# Patient Record
Sex: FEMALE | Race: WHITE | NOT HISPANIC OR LATINO | Employment: FULL TIME | URBAN - METROPOLITAN AREA
[De-identification: names, ages, dates, MRNs, and addresses within clinical notes are randomized per-mention and may not be internally consistent; named-entity substitution may affect disease eponyms.]

---

## 2020-12-15 ENCOUNTER — EVALUATION (OUTPATIENT)
Dept: OCCUPATIONAL THERAPY | Facility: CLINIC | Age: 55
End: 2020-12-15
Payer: COMMERCIAL

## 2020-12-15 DIAGNOSIS — M65.4 TENDINITIS, DE QUERVAIN'S: Primary | ICD-10-CM

## 2020-12-15 PROCEDURE — 97165 OT EVAL LOW COMPLEX 30 MIN: CPT

## 2020-12-22 ENCOUNTER — TRANSCRIBE ORDERS (OUTPATIENT)
Dept: PHYSICAL THERAPY | Facility: CLINIC | Age: 55
End: 2020-12-22

## 2020-12-22 ENCOUNTER — OFFICE VISIT (OUTPATIENT)
Dept: OCCUPATIONAL THERAPY | Facility: CLINIC | Age: 55
End: 2020-12-22
Payer: COMMERCIAL

## 2020-12-22 DIAGNOSIS — M65.4 TENDINITIS, DE QUERVAIN'S: Primary | ICD-10-CM

## 2020-12-22 PROCEDURE — 97140 MANUAL THERAPY 1/> REGIONS: CPT

## 2020-12-22 PROCEDURE — 97110 THERAPEUTIC EXERCISES: CPT

## 2020-12-29 ENCOUNTER — OFFICE VISIT (OUTPATIENT)
Dept: PHYSICAL THERAPY | Facility: CLINIC | Age: 55
End: 2020-12-29
Payer: COMMERCIAL

## 2020-12-29 DIAGNOSIS — M65.4 DE QUERVAIN'S TENOSYNOVITIS: Primary | ICD-10-CM

## 2021-01-05 ENCOUNTER — OFFICE VISIT (OUTPATIENT)
Dept: OCCUPATIONAL THERAPY | Facility: CLINIC | Age: 56
End: 2021-01-05
Payer: COMMERCIAL

## 2021-01-05 DIAGNOSIS — M65.4 TENDINITIS, DE QUERVAIN'S: Primary | ICD-10-CM

## 2021-01-05 PROCEDURE — 97110 THERAPEUTIC EXERCISES: CPT

## 2021-01-05 PROCEDURE — 97140 MANUAL THERAPY 1/> REGIONS: CPT

## 2021-01-05 PROCEDURE — 97530 THERAPEUTIC ACTIVITIES: CPT

## 2021-01-05 NOTE — PROGRESS NOTES
Daily Note     Today's date: 2021  Patient name: Susana Bell  : 1965  MRN: [id number]  Referring provider: Yovana Maddox MD  Dx:   Encounter Diagnosis     ICD-10-CM    1  Tendinitis, de Quervain's  M65 4                   Subjective: Patient notes she still has pain mostly in the tip of the thumb with excessive bending  Objective: See treatment diary below              Precautions: Dequervains release and IP joint cyst removal 2020       Manuals  2020     STR  x3 min  x3 min      Grade 2-3 mobs thumb  x10 x10                     Ther Ex        AROM wrist all planes x10 x10 x20     AROM thumb all planes x10 x10 x20     BROM thumb IP/MP x10 x10 x20     Education on HEP and dx yes yes Yes      Ther Activity        Wrist maze  x5 x5     Prayer stretch  x5 10 sec x5 10sec     Towel scrunch   x10 x10     Sponge /pinch   x10 pink     Clothes pin pinch   Red x10     Digit ext band   x10 yellow             Modalities        MHP 5 min  5min  5 min                 Assessment:   Patient tolerated session well  Patient session focused on using HEP, modalities, and AROM to improve ability to complete ADLs and self care with ease  Patient tolerated added TE this date with noted improved AROM  Patient benefiting from skilled OT to reduce dependence with ADLs  Plan:   Focus on ROM and strengthening to improve ability to complete ADLs with ease

## 2021-01-13 ENCOUNTER — OFFICE VISIT (OUTPATIENT)
Dept: OCCUPATIONAL THERAPY | Facility: CLINIC | Age: 56
End: 2021-01-13
Payer: COMMERCIAL

## 2021-01-13 DIAGNOSIS — M65.4 TENDINITIS, DE QUERVAIN'S: Primary | ICD-10-CM

## 2021-01-13 PROCEDURE — 97530 THERAPEUTIC ACTIVITIES: CPT

## 2021-01-13 PROCEDURE — 97140 MANUAL THERAPY 1/> REGIONS: CPT

## 2021-01-13 PROCEDURE — 97110 THERAPEUTIC EXERCISES: CPT

## 2021-01-13 NOTE — PROGRESS NOTES
Daily Note     Today's date: 2021  Patient name: Clifton Caban  : 1965  MRN: [id number]  Referring provider: Kosta Caballero MD  Dx:   Encounter Diagnosis     ICD-10-CM    1  Tendinitis, de Quervain's  M65 4                   Subjective: Patient notes she still has pain mostly in the tip of the thumb with excessive bending  Objective: Thumb flex MP/IP 50/62   See treatment diary below              Precautions: Dequervains release and IP joint cyst removal 2020       Manuals  2020    STR  x3 min  x3 min  x3 min    Grade 2-3 mobs thumb  x10 x10 7 min                     Ther Ex        AROM wrist all planes x10 x10 x20 x20    AROM thumb all planes x10 x10 x20 x20    BROM thumb IP/MP x10 x10 x20 x20    Education on HEP and dx yes yes Yes  Yes     PREs wrist flex/ext    2 lbs x20     Ther Activity        Wrist maze  x5 x5     Prayer stretch  x5 10 sec x5 10sec     Towel scrunch   x10 x10     Gripper    x10 pink x20 level 2 gold    Clothes pin pinch   Red x10 Red x20    Digit ext band   x10 yellow     Flex bar all planes    Red flex bar x20    Modalities        MHP 5 min  5min  5 min  5 min                Assessment:   Patient tolerated session well  Patient session focused on using HEP, modalities, and AROM to improve ability to complete ADLs and self care with ease  Patient tolerated added TE this date with noted improved AROM this date  Patient benefiting from skilled OT to reduce dependence with ADLs  Plan:   Focus on ROM and strengthening to improve ability to complete ADLs with ease

## 2021-01-14 ENCOUNTER — TRANSCRIBE ORDERS (OUTPATIENT)
Dept: OCCUPATIONAL THERAPY | Facility: CLINIC | Age: 56
End: 2021-01-14

## 2021-01-14 DIAGNOSIS — M65.4 TENDINITIS, DE QUERVAIN'S: Primary | ICD-10-CM

## 2021-01-19 ENCOUNTER — OFFICE VISIT (OUTPATIENT)
Dept: OCCUPATIONAL THERAPY | Facility: CLINIC | Age: 56
End: 2021-01-19
Payer: COMMERCIAL

## 2021-01-19 DIAGNOSIS — M65.4 TENDINITIS, DE QUERVAIN'S: Primary | ICD-10-CM

## 2021-01-19 PROCEDURE — 97530 THERAPEUTIC ACTIVITIES: CPT

## 2021-01-19 PROCEDURE — 97110 THERAPEUTIC EXERCISES: CPT

## 2021-01-19 NOTE — PROGRESS NOTES
Daily Note     Today's date: 2021  Patient name: Kenia Milan  : 1965  MRN: [id number]  Referring provider: Rekha Tilley MD  Dx:   Encounter Diagnosis     ICD-10-CM    1  Tendinitis, de Quervain's  M65 4        Start Time: 9576  Stop Time: 407  Total time in clinic (min): 37 minutes    Subjective: "When I use it it seems to tingle and burn " Patient reported that lifting with radial deviation and utilizing a screwdriver is painful  Patient reported a pain of 5/10 in her wrist post exercise  Objective:   See treatment diary below       Precautions: Dequervains release and IP joint cyst removal 2020       Manuals  2020   STR  x3 min  x3 min  x3 min x2 min   Grade 2-3 mobs thumb  x10 x10 7 min  5 min                   Ther Ex        AROM wrist all planes x10 x10 x20 x20 x15   AROM thumb all planes x10 x10 x20 x20 x15   BROM thumb IP/MP x10 x10 x20 x20 x15   Education on HEP and dx yes yes Yes  Yes     PREs wrist flex/ext/RD    2 lbs x20  2 lbs x10   Ther Activity        Wrist maze  x5 x5     Prayer stretch  x5 10 sec x5 10sec  x5 10 sec   Towel scrunch   x10 x10     Gripper    x10 pink x20 level 2 gold x20 level 2 gold   Clothes pin pinch   Red x10 Red x20 Red x20   Digit ext band   x10 yellow  x20 red   Flex bar all planes    Red flex bar x20 Red x20   Theraputty , roll, pinch     performed           Modalities        MHP 5 min  5min  5 min  5 min  5 min              Assessment:   Patient tolerated session well  Patient session focused on using modalities, range of motion, and gentle strengthening to improve ability to complete self care with minimal symptoms  Patient tolerated addition of wrist PREs this date without complaints of pain  Patient benefiting from skilled OT to reduce dependence with ADLs  Plan:   Focus on ROM and strengthening to improve ability to complete ADLs with ease

## 2021-01-26 ENCOUNTER — OFFICE VISIT (OUTPATIENT)
Dept: OCCUPATIONAL THERAPY | Facility: CLINIC | Age: 56
End: 2021-01-26
Payer: COMMERCIAL

## 2021-01-26 DIAGNOSIS — M65.4 TENDINITIS, DE QUERVAIN'S: Primary | ICD-10-CM

## 2021-01-26 PROCEDURE — 97140 MANUAL THERAPY 1/> REGIONS: CPT

## 2021-01-26 PROCEDURE — 97530 THERAPEUTIC ACTIVITIES: CPT

## 2021-01-26 PROCEDURE — 97110 THERAPEUTIC EXERCISES: CPT

## 2021-01-26 NOTE — PROGRESS NOTES
Daily Note     Today's date: 2021  Patient name: Jake Dodge  : 1965  MRN: [id number]  Referring provider: Niraj Holt MD  Dx:   Encounter Diagnosis     ICD-10-CM    1  Tendinitis, de Quervain's  M65 4                   Subjective: Patient offers no functional changes  Objective:   See treatment diary below       Precautions: Dequervains release and IP joint cyst removal 2020       Manuals  2020   STR  x3 min  x3 min  x3 min x3 min   Grade 2-3 mobs thumb  x10 x10 7 min  7 min                   Ther Ex        AROM wrist all planes x10 x10 x20 x20 x15   AROM thumb all planes x10 x10 x20 x20 x15   BROM thumb IP/MP x10 x10 x20 x20 x15   Education on HEP and dx yes yes Yes  Yes  yes   PREs wrist flex/ext/RD    2 lbs x20  3 lbs x10   Ther Activity        Wrist maze  x5 x5     Prayer stretch  x5 10 sec x5 10sec     Towel scrunch   x10 x10     Gripper    x10 pink x20 level 2 gold x20 level 2 gold   Clothes pin pinch   Red x10 Red x20 Red x20   Digit ext band   x10 yellow  x20 red   Flex bar all planes    Red flex bar x20 Red x20   Theraputty , roll, pinch                Modalities        MHP 5 min  5min  5 min  5 min  5 min              Assessment:   Patient tolerated session well  Patient session focused on using modalities, range of motion, and gentle strengthening to improve ability to complete self care with minimal symptoms  Patient benefiting from skilled OT to reduce dependence with ADLs  HEP reviewed this date  Patient WFL at this time  Patient will step down to HEP  Plan:   D/C to HEP

## 2022-03-09 ENCOUNTER — TELEPHONE (OUTPATIENT)
Dept: OBGYN CLINIC | Facility: MEDICAL CENTER | Age: 57
End: 2022-03-09

## 2022-03-09 NOTE — TELEPHONE ENCOUNTER
Patient wants to schedule appointment for Dr Stacie Esquivel  She had surgery on right wrist, tendon release on 12/01/2020  She will sign for medical record release and have records faxed to office for  review and if office would call her to let her know if dr will see her for second opinion, she is having pain again in her wrist     CB # [phone], can leave message

## 2022-04-05 ENCOUNTER — APPOINTMENT (OUTPATIENT)
Dept: RADIOLOGY | Facility: CLINIC | Age: 57
End: 2022-04-05
Payer: COMMERCIAL

## 2022-04-05 ENCOUNTER — OFFICE VISIT (OUTPATIENT)
Dept: OBGYN CLINIC | Facility: CLINIC | Age: 57
End: 2022-04-05
Payer: COMMERCIAL

## 2022-04-05 VITALS
HEART RATE: 80 BPM | WEIGHT: 152 LBS | DIASTOLIC BLOOD PRESSURE: 72 MMHG | HEIGHT: 65 IN | BODY MASS INDEX: 25.33 KG/M2 | SYSTOLIC BLOOD PRESSURE: 115 MMHG | TEMPERATURE: 97.2 F

## 2022-04-05 DIAGNOSIS — Z98.890 STATUS POST DE QUERVAIN'S RELEASE SURGERY: Primary | ICD-10-CM

## 2022-04-05 DIAGNOSIS — M25.531 PAIN IN RIGHT WRIST: ICD-10-CM

## 2022-04-05 PROCEDURE — 99203 OFFICE O/P NEW LOW 30 MIN: CPT | Performed by: ORTHOPAEDIC SURGERY

## 2022-04-05 PROCEDURE — 20550 NJX 1 TENDON SHEATH/LIGAMENT: CPT | Performed by: ORTHOPAEDIC SURGERY

## 2022-04-05 PROCEDURE — 73110 X-RAY EXAM OF WRIST: CPT

## 2022-04-05 RX ORDER — ASPIRIN 81 MG/1
81 TABLET ORAL DAILY
COMMUNITY

## 2022-04-05 RX ORDER — DEXAMETHASONE SODIUM PHOSPHATE 100 MG/10ML
40 INJECTION INTRAMUSCULAR; INTRAVENOUS
Status: COMPLETED | OUTPATIENT
Start: 2022-04-05 | End: 2022-04-05

## 2022-04-05 RX ORDER — LIDOCAINE HYDROCHLORIDE 10 MG/ML
0.5 INJECTION, SOLUTION INFILTRATION; PERINEURAL
Status: COMPLETED | OUTPATIENT
Start: 2022-04-05 | End: 2022-04-05

## 2022-04-05 RX ADMIN — LIDOCAINE HYDROCHLORIDE 0.5 ML: 10 INJECTION, SOLUTION INFILTRATION; PERINEURAL at 09:33

## 2022-04-05 RX ADMIN — DEXAMETHASONE SODIUM PHOSPHATE 40 MG: 100 INJECTION INTRAMUSCULAR; INTRAVENOUS at 09:33

## 2022-04-05 NOTE — PROGRESS NOTES
Assessment/Plan:  1  Status post de Quervain's release surgery  Ambulatory Referral to PT/OT Hand Therapy    Hand/upper extremity injection: R extensor compartment 1   2  Pain in right wrist  XR wrist 3+ vw right       Scribe Attestation    I,:  Justine Anaya MA am acting as a scribe while in the presence of the attending physician :       I,:  Eliazar Martinez DO personally performed the services described in this documentation    as scribed in my presence  :             21-year-old female status post right wrist de quervain's release performed on 12/1/2020 by Dr Paolo Malloy  She is non tender over the scar  She does have a positive tinel's over the scar  I discussed with her that she likely has scar tissue that is irritating the nerve  Patient has been wrapping coban tightly around her wrist  Patient was advised to discontinue the coban wrap  Treatment options were discussed in the form of bracing, a steroid injection, and formal therapy to work on scar massage  Patient was agreeable to this  She consented and underwent the steroid injection in the office today without any complications  She can continue with the soft brace  A referral was provided to OT for scar massage  She was advised to use Voltaren Gel OTC as needed for pain  She may continue to work full duty with no restrictions  She will follow up in 2 months for repeat evaluation  Subjective:   Hina Lechuga is a 55 y o  female who presents to the office today for evaluation of right wrist pain  Patient did undergo right wrist de quervain's release and right humb IP joint cyst removal on 12/1/20 with Dr Paolo Malloy  Patient states the surgery did provide her with appx 10 months worth of relief and her pain started to return  She notes pain to the radial aspect of her wrist which is increased with   She states the pain is not as severe as prior to surgery  She also notes numbness and tingling to the radial aspect of her wrist that radiates up the thumb  Patient does work for a [employer] and does use a lot of tools  Patient did do therapy after surgery and most recently as been doing therapy over the past month in [place], [place]  She has also tried a thumb brace, and OTC wrist brace, and coban  She has not been taking anything OTC for pain  Patient states she was evaluated by Dr Taylor Luciano who recommend she seek a second opinion  She denies any injury or trauma  Review of Systems   Constitutional: Negative for chills and fever  HENT: Negative for drooling and sneezing  Eyes: Negative for redness  Respiratory: Negative for cough and wheezing  Gastrointestinal: Negative for nausea and vomiting  Musculoskeletal: Negative for arthralgias, joint swelling and myalgias  Neurological: Negative for weakness and numbness  Psychiatric/Behavioral: Negative for behavioral problems  The patient is not nervous/anxious  No past medical history on file  No past surgical history on file  No family history on file      Social History     Occupational History    Not on file   Tobacco Use    Smoking status: Current Every Day Smoker    Smokeless tobacco: Never Used   Vaping Use    Vaping Use: Never used   Substance and Sexual Activity    Alcohol use: Not on file    Drug use: Not on file    Sexual activity: Not on file         Current Outpatient Medications:     aspirin (ECOTRIN LOW STRENGTH) 81 mg EC tablet, Take 81 mg by mouth daily, Disp: , Rfl:     Calcium Carbonate Antacid (CALCIUM CARBONATE PO), Take 1,000 mg by mouth daily, Disp: , Rfl:     Multiple Vitamin (MULTIVITAMIN ADULT PO), Take 1 tablet by mouth daily, Disp: , Rfl:     TURMERIC PO, Take by mouth daily, Disp: , Rfl:     No Known Allergies    Objective:  Vitals:    04/05/22 0902   BP: 115/72   Pulse: 80   Temp: (!) 97 2 °F (36 2 °C)       Ortho Exam     Right wrist    NTTP scar  No palpable masses  EPL FPL intact  + tinel's over scar  Compartments soft  Brisk capillary refill  S/m intact median, radial, and ulnar nerve     Physical Exam  Constitutional:       Appearance: She is well-developed  HENT:      Head: Normocephalic and atraumatic  Eyes:      General:         Right eye: No discharge  Left eye: No discharge  Conjunctiva/sclera: Conjunctivae normal    Cardiovascular:      Rate and Rhythm: Normal rate  Pulmonary:      Effort: Pulmonary effort is normal  No respiratory distress  Musculoskeletal:      Cervical back: Normal range of motion and neck supple  Comments: As noted in HPI   Skin:     General: Skin is warm and dry  Neurological:      Mental Status: She is alert and oriented to person, place, and time  Psychiatric:         Behavior: Behavior normal          Thought Content: Thought content normal          Judgment: Judgment normal      Hand/upper extremity injection: R extensor compartment 1  Universal Protocol:  Consent: Verbal consent obtained  Consent given by: patient  Patient identity confirmed: verbally with patient    Supporting Documentation  Indications: pain   Procedure Details  Condition:de Quervain's tenosynovitis Site: R extensor compartment 1   Preparation: Patient was prepped and draped in the usual sterile fashion  Needle size: 27 G  Ultrasound guidance: no  Medications administered: 0 5 mL lidocaine 1 %; 40 mg dexamethasone 100 mg/10 mL    Patient tolerance: patient tolerated the procedure well with no immediate complications  Dressing:  Sterile dressing applied             I have personally reviewed pertinent films in PACS and my interpretation is as follows:X-ray right wrist performed in the office today demonstrates no osseous abnormalities

## 2022-06-06 ENCOUNTER — OFFICE VISIT (OUTPATIENT)
Dept: OBGYN CLINIC | Facility: CLINIC | Age: 57
End: 2022-06-06
Payer: COMMERCIAL

## 2022-06-06 VITALS
DIASTOLIC BLOOD PRESSURE: 79 MMHG | HEART RATE: 59 BPM | BODY MASS INDEX: 26.33 KG/M2 | WEIGHT: 158 LBS | HEIGHT: 65 IN | SYSTOLIC BLOOD PRESSURE: 133 MMHG

## 2022-06-06 DIAGNOSIS — Z98.890 STATUS POST DE QUERVAIN'S RELEASE SURGERY: Primary | ICD-10-CM

## 2022-06-06 PROCEDURE — 99213 OFFICE O/P EST LOW 20 MIN: CPT | Performed by: ORTHOPAEDIC SURGERY

## 2022-06-06 NOTE — PROGRESS NOTES
Assessment/Plan:  1  Status post de Quervain's release surgery         Scribe Attestation    I,:  Justine Anaya MA am acting as a scribe while in the presence of the attending physician :       I,:  Igor Hernandez DO personally performed the services described in this documentation    as scribed in my presence  :             56-year-old female status post right wrist de quervain's release performed on 12/1/2020 by Dr Ceci Mehta  Patient does have a positive tinel's over the scar  I did discuss she may have scar formation  Patient has tried and failed non operative treatment in the form of bracing, a steroid injection, and formal therapy  We did discuss possible surgical intervention to release the scar  I did discuss I am leaving the practice and will be unable to follow up with the patient  She was instructed to follow up with one of my partner's Dr Gail Barbosa or Dr Marion Dunne for evaluation  She can continue with scar massage  Subjective:   Bella Vasquez is a 55 y o  female who presents to the office today for follow up evaluation of her right wrist  Patient did undergo right wrist de quervain's release and right humb IP joint cyst removal on 12/1/20 with Dr Ceci Mehta  Patient underwent a steroid injection at her last visit on 4/5/22 which she states did provide her with some relief  She notes continued tingling to the radial aspect of her wrist that radiates into her thumb  She states this is unchanged since her last visit  She did attend therapy for scar massage which she states did not provide her with relief  She has discontinued the use of the coban  she notes occ pain to the radial aspect of her wrist with overuse  She does not take anything OTC for pain  Review of Systems   Constitutional: Negative for chills and fever  HENT: Negative for drooling and sneezing  Eyes: Negative for redness  Respiratory: Negative for cough and wheezing  Gastrointestinal: Negative for nausea and vomiting  Musculoskeletal: Negative for arthralgias, joint swelling and myalgias  Neurological: Negative for weakness and numbness  Psychiatric/Behavioral: Negative for behavioral problems  The patient is not nervous/anxious  History reviewed  No pertinent past medical history  History reviewed  No pertinent surgical history  History reviewed  No pertinent family history  Social History     Occupational History    Not on file   Tobacco Use    Smoking status: Current Every Day Smoker    Smokeless tobacco: Never Used   Vaping Use    Vaping Use: Never used   Substance and Sexual Activity    Alcohol use: Not on file    Drug use: Not on file    Sexual activity: Not on file         Current Outpatient Medications:     aspirin (ECOTRIN LOW STRENGTH) 81 mg EC tablet, Take 81 mg by mouth daily, Disp: , Rfl:     Calcium Carbonate Antacid (CALCIUM CARBONATE PO), Take 1,000 mg by mouth daily, Disp: , Rfl:     Multiple Vitamin (MULTIVITAMIN ADULT PO), Take 1 tablet by mouth daily, Disp: , Rfl:     TURMERIC PO, Take by mouth daily, Disp: , Rfl:     Allergies   Allergen Reactions    Amoxicillin-Pot Clavulanate GI Intolerance     VOMITTING    Ciprofloxacin Other (See Comments)     HEADACHES       Objective:  Vitals:    06/06/22 1513   BP: 133/79   Pulse: 59       Ortho Exam     Right wrist    + tinel's over the scar  Full fist  EPL FPL intact  FDS FDP ext intact  Good sensation over radial nerve distrubution   Compartments soft  Brisk capillary refill  S/m intact median, radial, and ulnar nerve     Physical Exam  Constitutional:       Appearance: She is well-developed  HENT:      Head: Normocephalic and atraumatic  Eyes:      General:         Right eye: No discharge  Left eye: No discharge  Conjunctiva/sclera: Conjunctivae normal    Cardiovascular:      Rate and Rhythm: Normal rate  Pulmonary:      Effort: Pulmonary effort is normal  No respiratory distress     Musculoskeletal: Cervical back: Normal range of motion and neck supple  Comments: As noted in HPI   Skin:     General: Skin is warm and dry  Neurological:      Mental Status: She is alert and oriented to person, place, and time  Psychiatric:         Behavior: Behavior normal          Thought Content:  Thought content normal          Judgment: Judgment normal

## 2023-02-02 ENCOUNTER — TELEPHONE (OUTPATIENT)
Dept: DERMATOLOGY | Facility: CLINIC | Age: 58
End: 2023-02-02

## 2024-12-09 ENCOUNTER — OFFICE VISIT (OUTPATIENT)
Age: 59
End: 2024-12-09
Payer: COMMERCIAL

## 2024-12-09 VITALS — WEIGHT: 161 LBS | BODY MASS INDEX: 26.79 KG/M2 | TEMPERATURE: 97.6 F

## 2024-12-09 DIAGNOSIS — L71.9 ROSACEA: Primary | ICD-10-CM

## 2024-12-09 PROCEDURE — 99204 OFFICE O/P NEW MOD 45 MIN: CPT | Performed by: DERMATOLOGY

## 2024-12-09 RX ORDER — DOXYCYCLINE HYCLATE 20 MG
TABLET ORAL
Qty: 60 TABLET | Refills: 1 | Status: SHIPPED | OUTPATIENT
Start: 2024-12-09 | End: 2025-02-09

## 2024-12-09 NOTE — PROGRESS NOTES
"[facility] Note     Patient Name: Suzette Carr  Encounter Date: 12/9/24     Have you been cared for by a [facility] Dermatologist in the last 3 years and, if so, which description applies to you?    NO.   I am considered a \"new\" patient and must complete all patient intake questions. I am FEMALE/of child-bearing potential.    REVIEW OF SYSTEMS:  Have you recently had or currently have any of the following? Recent fever or chills? No  Any non-healing wound? No  Are you pregnant or planning to become pregnant? No  Are you currently or planning to be nursing or breast feeding? No   PAST MEDICAL HISTORY:  Have you personally ever had or currently have any of the following?  If \"YES,\" then please provide more detail. Skin cancer (such as Melanoma, Basal Cell Carcinoma, Squamous Cell Carcinoma?  No  Tuberculosis, HIV/AIDS, Hepatitis B or C: No  Radiation Treatment No   HISTORY OF IMMUNOSUPPRESSION:   Do you have a history of any of the following:  Systemic Immunosuppression such as Diabetes, Biologic or Immunotherapy, Chemotherapy, Organ Transplantation, Bone Marrow Transplantation or Prednsione?  No    Answering \"YES\" requires the addition of the dotphrase \"IMMUNOSUPPRESSED\" as the first diagnosis of the patient's visit.   FAMILY HISTORY:  Any \"first degree relatives\" (parent, brother, sister, or child) with the following?    Skin Cancer, Pancreatic or Other Cancer? No   PATIENT EXPERIENCE:    Do you want the Dermatologist to perform a COMPLETE skin exam today including a clinical examination under the \"bra and underwear\" areas?  NO  If necessary, do we have your permission to call and leave a detailed message on your Preferred Phone number that includes your specific medical information?  NO      Allergies   Allergen Reactions    Amoxicillin-Pot Clavulanate GI Intolerance     VOMITTING    Ciprofloxacin Other (See Comments)     HEADACHES      Current Outpatient Medications:     aspirin (ECOTRIN LOW " STRENGTH) 81 mg EC tablet, Take 81 mg by mouth daily, Disp: , Rfl:     Calcium Carbonate Antacid (CALCIUM CARBONATE PO), Take 1,000 mg by mouth daily, Disp: , Rfl:     Multiple Vitamin (MULTIVITAMIN ADULT PO), Take 1 tablet by mouth daily, Disp: , Rfl:     TURMERIC PO, Take by mouth daily, Disp: , Rfl:           Whom besides the patient is providing clinical information about today's encounter?   NO ADDITIONAL HISTORIAN (patient alone provided history)    Physical Exam and Assessment/Plan by Diagnosis:    ROSACEA    Physical Exam:  Anatomic Location Affected:  face  Morphological Description:  clear today  Pertinent Positives:  Pertinent Negatives:    Additional History of Present Condition:  Patient notes that had rash on neck and face then going to arms; doctor said it was  staph infection and was giving antibiotics didn't go away then went to another doctor and was prescribed Spironolactone and a topical pimecrolimus cream and didn't help, comes and goes, breaks out in bumps she says when she squeezes them nothing comes but like water, patient states has been happening for 3 years now, patient went for allergy testing in past, patient washing with First aid from [employer]    Assessment and Plan:  Based on a thorough discussion of this condition and the management approach to it (including a comprehensive discussion of the known risks, side effects and potential benefits of treatment), the patient (family) agrees to implement the following specific plan:     Don't use scrubs on face   Use Gentle cleansers; Cera Ve, Cetaphil, Neutrogena  Apply Metronidazole 0.75% cream to face twice daily   Start Doxycycline 20 mg twice daily for 2 months take with food and water both am and pm  Follow up in 2-3 months    Rosacea is a chronic rash affecting the mid-face including the nose, cheeks, chin, forehead, and eyelids. The incidence is usually greatest between the ages of 30-60 years and is more common in people with fair skin.  Common characteristics include redness, telangiectasias, papules and pustules over affected areas. Rosacea may look similar to acne, but there is a lack of comedones. Occasionally the eyes may also be involved in ocular rosacea. In advanced disease, enlargement of the sebaceous glands in the nose, termed rhinophyma, may be present.     Rosacea results in red spots (papules) and sometimes pustules over the face, but unlike acne there are no blackheads, whiteheads, or cystic nodules. Patients often experience increased facial flushing with prominent blood vessels (erythematotelangiectatic rosacea) and dry, sensitive skin. These symptoms are exacerbated by sun exposure, hot or spicy foods, topical steroids and oil-based facial products.     In ocular rosacea, eyelids may be red and sore due to conjunctivitis, keratitis, and episcleritis. If rhinophyma develops due to enlargement of sebaceous glands, the patient may have an enlarged and irregularly shaped nose with prominent pores. In rosacea that is refractory to treatment, patients can develop persistent redness and swelling of the face due to lymphatic obstruction (Morbihan disease).     Distribution around the cheeks may be confused with the malar or “butterfly rash” of lupus. However, the rash of lupus spares the nasal creases and lacks papules and pustules. If signs of photosensitivity, oral ulcers, arthritis, and kidney dysfunction are present then consider referral to a rheumatologist.     There are many potential causes of rosacea including genetic, environmental, vascular, and inflammatory factors. These include, but are not limited to:  Chronic exposure to ultraviolet radiation   Increased immune responses in the form of cathelicidins that promote vessel dilation and infiltration with white blood cells (neutrophils) into the dermis  Increased matrix metalloproteinases such as collagen and elastase that remodel normal tissue may contribute to inflammation of  the skin making it thicker and harder  There is some evidence to suggest that increased numbers of demodex mites on patient skin may contribute to rosacea papules     General Treatment Approach   Avoid exacerbating factors such as heat, spicy foods, and alcohol   Use daily SPF30+ sunscreen and other methods of coverage for sun protection  Use water-based make-up   Avoid applying topical steroids to affected areas as they can cause perioral dermatitis and exacerbate rosacea     Topical Treatment Approach  Metronidazole cream or gel by itself or in combination with oral antibiotics for more severe cases  Azelaic acid cream or lotion is effective for mild inflammatory rosacea when applied twice daily to affected areas  Brimonidine gel and oxymetazoline hydrochloride cream can reduce facial redness temporarily   Ivermectin cream can treat papulopustular rosacea by controlling demodex mites and inflammation   Pimecrolimus cream or tacrolimus ointment twice a day for 2-3 months can help reduce inflammation    Oral Treatment Approach  Antibiotics such as doxycycline, minocycline, or erythromycin for 1-3 months  Clonidine and carvedilol can help reduce facial flushing and are generally well tolerated. Common side effects include low blood pressure, gastrointestinal upset, dry eyes, blurred vision and low heart rate.   Isotretinoin at low doses can be effective for long term treatment when antibiotics fail. Side effects may make it unsuitable for some patients.   NSAIDs such as diclofenac can help reduce discomfort and redness in the skin.     Procedural/Surgical Treatment Approach   Vascular lasers or intense pulsed light treatment may be used to treat persistent telangiectasia and papulopustular rosacea  Plastic surgery and carbon dioxide lasers may be used to treat rhinophyma   ACTINIC DAMAGE (Chronic Ultraviolet Radiation Exposure)    Physical Exam:  Anatomic Location Affected:  forearms  Morphological Description:   Mottled but primarily hyperpigmented), slightly atrophic skin   Pertinent Positives:  Pertinent Negatives:    Additional History of Present Condition:  worse in the summer, asymptomatic    Assessment and Plan:  Based on a thorough discussion of this condition and the management approach to it (including a comprehensive discussion of the known risks, side effects and potential benefits of treatment), the patient (family) agrees to implement the following specific plan:  For arms: Can apply Soy, Vitamin C, Bleaching Creams, Sun Protection  Sample of melan-B from [place] given     Photo-aging and actinic damage of skin is common on sites repeatedly exposed to the sun, especially the backs of the hands and the face, most often affecting the ears, nose, cheeks, upper lip, vermilion of the lower lip, temples, forehead and balding scalp. In severely chronically sun-exposed individuals, this condition may also be found on the upper trunk, upper and lower limbs, and dorsum of feet.    Photo-aging induces cutaneous changes that vary among individuals, reflecting inherent differences in vulnerability to sun exposure and repair capacity.    We discussed further steps to minimize or avoid UV exposure:    Be aware of daily UV index levels. In the [facility], this index is often reported on the [employer] Weather Show.  Avoid outdoor activities during the middle of the day.  Wear sun-protective clothing (e.g., UPF-rated, broad-brimmed hats, long sleeves, and trousers or skirts).  Apply a high sun protection factor (60+) broad-spectrum sunscreen moisturizer at least three times a day to affected areas, year-round.  I recommended Neutrogena Daily Defense or CeraVe AM or Aveeno.  Do not smoke, and where possible, avoid exposure to pollutants.  Get plenty of exercise -- active people appear younger than inactive people.  Eat fruit and vegetables daily.  Many oral supplements with antioxidant and anti-inflammatory properties  have been advocated to mitigate skin aging and to improve skin health. These include carotenoids; polyphenols; chlorophyll; aloe vera; vitamins B, C, and E; red ginseng; squalene; and omega-3 fatty acids. Their role in combatting skin aging is unclear.   For arms: Can apply Soy, Vitamin C, Bleaching Creams, Sun Protection      Scribe Attestation      I,:  Debora Ceballos am acting as a scribe while in the presence of the attending physician.:       I,:  Isela Cespedes MD personally performed the services described in this documentation    as scribed in my presence.:

## 2024-12-09 NOTE — PATIENT INSTRUCTIONS
ROSACEA    Assessment and Plan:  Based on a thorough discussion of this condition and the management approach to it (including a comprehensive discussion of the known risks, side effects and potential benefits of treatment), the patient (family) agrees to implement the following specific plan:     Don't use scrubs on face   Use Gentle cleansers; Cera Ve, Cetaphil, Neutrogena  Apply Metronidazole 0.75% cream to face twice daily   Start Doxycycline 20 mg twice daily for 2 months take with food and water both am and pm  Follow up in 2-3 months    Rosacea is a chronic rash affecting the mid-face including the nose, cheeks, chin, forehead, and eyelids. The incidence is usually greatest between the ages of 30-60 years and is more common in people with fair skin. Common characteristics include redness, telangiectasias, papules and pustules over affected areas. Rosacea may look similar to acne, but there is a lack of comedones. Occasionally the eyes may also be involved in ocular rosacea. In advanced disease, enlargement of the sebaceous glands in the nose, termed rhinophyma, may be present.     Rosacea results in red spots (papules) and sometimes pustules over the face, but unlike acne there are no blackheads, whiteheads, or cystic nodules. Patients often experience increased facial flushing with prominent blood vessels (erythematotelangiectatic rosacea) and dry, sensitive skin. These symptoms are exacerbated by sun exposure, hot or spicy foods, topical steroids and oil-based facial products.     In ocular rosacea, eyelids may be red and sore due to conjunctivitis, keratitis, and episcleritis. If rhinophyma develops due to enlargement of sebaceous glands, the patient may have an enlarged and irregularly shaped nose with prominent pores. In rosacea that is refractory to treatment, patients can develop persistent redness and swelling of the face due to lymphatic obstruction (Morbihan disease).     Distribution around the  cheeks may be confused with the malar or “butterfly rash” of lupus. However, the rash of lupus spares the nasal creases and lacks papules and pustules. If signs of photosensitivity, oral ulcers, arthritis, and kidney dysfunction are present then consider referral to a rheumatologist.     There are many potential causes of rosacea including genetic, environmental, vascular, and inflammatory factors. These include, but are not limited to:  Chronic exposure to ultraviolet radiation   Increased immune responses in the form of cathelicidins that promote vessel dilation and infiltration with white blood cells (neutrophils) into the dermis  Increased matrix metalloproteinases such as collagen and elastase that remodel normal tissue may contribute to inflammation of the skin making it thicker and harder  There is some evidence to suggest that increased numbers of demodex mites on patient skin may contribute to rosacea papules     General Treatment Approach   Avoid exacerbating factors such as heat, spicy foods, and alcohol   Use daily SPF30+ sunscreen and other methods of coverage for sun protection  Use water-based make-up   Avoid applying topical steroids to affected areas as they can cause perioral dermatitis and exacerbate rosacea     Topical Treatment Approach  Metronidazole cream or gel by itself or in combination with oral antibiotics for more severe cases  Azelaic acid cream or lotion is effective for mild inflammatory rosacea when applied twice daily to affected areas  Brimonidine gel and oxymetazoline hydrochloride cream can reduce facial redness temporarily   Ivermectin cream can treat papulopustular rosacea by controlling demodex mites and inflammation   Pimecrolimus cream or tacrolimus ointment twice a day for 2-3 months can help reduce inflammation    Oral Treatment Approach  Antibiotics such as doxycycline, minocycline, or erythromycin for 1-3 months  Clonidine and carvedilol can help reduce facial flushing  and are generally well tolerated. Common side effects include low blood pressure, gastrointestinal upset, dry eyes, blurred vision and low heart rate.   Isotretinoin at low doses can be effective for long term treatment when antibiotics fail. Side effects may make it unsuitable for some patients.   NSAIDs such as diclofenac can help reduce discomfort and redness in the skin.     Procedural/Surgical Treatment Approach   Vascular lasers or intense pulsed light treatment may be used to treat persistent telangiectasia and papulopustular rosacea  Plastic surgery and carbon dioxide lasers may be used to treat rhinophyma     For arms: Can apply Soy, Vitamin C, Bleaching Creams, Sun Protection

## 2025-01-03 ENCOUNTER — TELEPHONE (OUTPATIENT)
Age: 60
End: 2025-01-03

## 2025-01-03 NOTE — TELEPHONE ENCOUNTER
Patient called and stated new break out on face since laurent , denies sings of infection , pain or itchiness .   Rash is on jaw line , side of face and spreading to the hair line .   She is at work and unable to send pictures via [employer] , she would like to know if she can be seen today and have scraping done .  Please return her call okay to leave voice message.

## 2025-01-06 ENCOUNTER — TELEPHONE (OUTPATIENT)
Age: 60
End: 2025-01-06

## 2025-01-07 NOTE — TELEPHONE ENCOUNTER
Spoke with the patient,  she stated that the spironolactone is for the Rosacea, patient stated that she never was on the spironolactone before, but she is interested in taking now, she also wants to know if she should continue on the doxy. Patient also noted that the metronidazole caused her to break out.

## 2025-02-09 DIAGNOSIS — L71.9 ROSACEA: ICD-10-CM

## 2025-02-10 ENCOUNTER — OFFICE VISIT (OUTPATIENT)
Age: 60
End: 2025-02-10
Payer: COMMERCIAL

## 2025-02-10 VITALS — HEIGHT: 65 IN | TEMPERATURE: 98 F | WEIGHT: 158 LBS | BODY MASS INDEX: 26.33 KG/M2

## 2025-02-10 DIAGNOSIS — L71.9 ROSACEA: Primary | ICD-10-CM

## 2025-02-10 DIAGNOSIS — L73.9 FOLLICULITIS: ICD-10-CM

## 2025-02-10 PROCEDURE — 99214 OFFICE O/P EST MOD 30 MIN: CPT | Performed by: DERMATOLOGY

## 2025-02-10 RX ORDER — CLINDAMYCIN PHOSPHATE 10 UG/ML
LOTION TOPICAL 2 TIMES DAILY
Qty: 60 ML | Refills: 2 | Status: SHIPPED | OUTPATIENT
Start: 2025-02-10

## 2025-02-10 RX ORDER — DOXYCYCLINE HYCLATE 20 MG
TABLET ORAL
Qty: 90 TABLET | Refills: 1 | Status: SHIPPED | OUTPATIENT
Start: 2025-02-10 | End: 2025-05-10

## 2025-02-10 RX ORDER — AZELAIC ACID 0.2 G/G
CREAM CUTANEOUS 2 TIMES DAILY
Qty: 50 G | Refills: 2 | Status: SHIPPED | OUTPATIENT
Start: 2025-02-10

## 2025-02-10 NOTE — PROGRESS NOTES
"[facility] Note     Patient Name: Suzette Carr  Encounter Date: 02/10/2025     Have you been cared for by a [facility] in the last 3 years and, if so, which description applies to you?    Yes.  I have been here within the last 3 years, and my medical history has NOT changed since that time.  I am FEMALE/of child-bearing potential.    REVIEW OF SYSTEMS:  Have you recently had or currently have any of the following? No changes in my recent health.   PAST MEDICAL HISTORY:  Have you personally ever had or currently have any of the following?  If \"YES,\" then please provide more detail. No changes in my medical history.   HISTORY OF IMMUNOSUPPRESSION: Do you have a history of any of the following:  Systemic Immunosuppression such as Diabetes, Biologic or Immunotherapy, Chemotherapy, Organ Transplantation, Bone Marrow Transplantation or Prednisone?  No     Answering \"YES\" requires the addition of the dotphrase \"IMMUNOSUPPRESSED\" as the first diagnosis of the patient's visit.   FAMILY HISTORY:  Any \"first degree relatives\" (parent, brother, sister, or child) with the following?    No changes in my family's known health.   PATIENT EXPERIENCE:    Do you want the Dermatologist to perform a COMPLETE skin exam today including a clinical examination under the \"bra and underwear\" areas?  NO  If necessary, do we have your permission to call and leave a detailed message on your Preferred Phone number that includes your specific medical information?  Yes      Allergies   Allergen Reactions   • Amoxicillin-Pot Clavulanate GI Intolerance     VOMITTING   • Ciprofloxacin Other (See Comments)     HEADACHES      Current Outpatient Medications:   •  aspirin (ECOTRIN LOW STRENGTH) 81 mg EC tablet, Take 81 mg by mouth daily, Disp: , Rfl:   •  azelaic acid (Azelex) 20 % cream, Apply topically 2 (two) times a day, Disp: 50 g, Rfl: 2  •  Calcium Carbonate Antacid (CALCIUM CARBONATE PO), Take 1,000 mg by mouth " daily, Disp: , Rfl:   •  clindamycin (CLEOCIN T) 1 % lotion, Apply topically 2 (two) times a day, Disp: 60 mL, Rfl: 2  •  doxycycline (PERIOSTAT) 20 MG tablet, TAKE ONE TABLET BY MOUTH TWICE A DAY FOR 2 MONTHS. TAKE WITH FOOD AND WATER BOTH IN THE MORNING AND EVENING (GENERIC FOR PERIOSTAT), Disp: 90 tablet, Rfl: 1  •  Multiple Vitamin (MULTIVITAMIN ADULT PO), Take 1 tablet by mouth daily, Disp: , Rfl:   •  TURMERIC PO, Take by mouth daily, Disp: , Rfl:   •  metroNIDAZOLE (METROCREAM) 0.75 % cream, Apply twice daily to face (Patient not taking: Reported on 2/10/2025), Disp: 45 g, Rfl: 4          Whom besides the patient is providing clinical information about today's encounter?   NO ADDITIONAL HISTORIAN (patient alone provided history)    Physical Exam and Assessment/Plan by Diagnosis:    Chief complaint: Patient is a 60 y/o female present for a follow up of Rosacea on the face. Currently using Doxycycline 20mg twice a day and still having break outs on the face and back. She has not been able to use Metrocream or La Roche Posay products.    ROSACEA    Physical Exam:  Anatomic Location Affected:  Face  Morphological Description:  clear  Pertinent Positives:  Pertinent Negatives:    Additional History of Present Condition:  currently on Doxycycline 20mg twice a day, metronidazole burned her face and she couldn't use    Assessment and Plan:  Based on a thorough discussion of this condition and the management approach to it (including a comprehensive discussion of the known risks, side effects and potential benefits of treatment), the patient (family) agrees to implement the following specific plan:  Stop Metrocream  Azelaic Acid Cream apply to face twice a day  Continue with Doxycycline 20mg twice a day    Rosacea is a chronic rash affecting the mid-face including the nose, cheeks, chin, forehead, and eyelids. The incidence is usually greatest between the ages of 30-60 years and is more common in people with fair  skin. Common characteristics include redness, telangiectasias, papules and pustules over affected areas. Rosacea may look similar to acne, but there is a lack of comedones. Occasionally the eyes may also be involved in ocular rosacea. In advanced disease, enlargement of the sebaceous glands in the nose, termed rhinophyma, may be present.     Rosacea results in red spots (papules) and sometimes pustules over the face, but unlike acne there are no blackheads, whiteheads, or cystic nodules. Patients often experience increased facial flushing with prominent blood vessels (erythematotelangiectatic rosacea) and dry, sensitive skin. These symptoms are exacerbated by sun exposure, hot or spicy foods, topical steroids and oil-based facial products.     In ocular rosacea, eyelids may be red and sore due to conjunctivitis, keratitis, and episcleritis. If rhinophyma develops due to enlargement of sebaceous glands, the patient may have an enlarged and irregularly shaped nose with prominent pores. In rosacea that is refractory to treatment, patients can develop persistent redness and swelling of the face due to lymphatic obstruction (Morbihan disease).     Distribution around the cheeks may be confused with the malar or “butterfly rash” of lupus. However, the rash of lupus spares the nasal creases and lacks papules and pustules. If signs of photosensitivity, oral ulcers, arthritis, and kidney dysfunction are present then consider referral to a rheumatologist.     There are many potential causes of rosacea including genetic, environmental, vascular, and inflammatory factors. These include, but are not limited to:  Chronic exposure to ultraviolet radiation   Increased immune responses in the form of cathelicidins that promote vessel dilation and infiltration with white blood cells (neutrophils) into the dermis  Increased matrix metalloproteinases such as collagen and elastase that remodel normal tissue may contribute to  inflammation of the skin making it thicker and harder  There is some evidence to suggest that increased numbers of demodex mites on patient skin may contribute to rosacea papules     General Treatment Approach   Avoid exacerbating factors such as heat, spicy foods, and alcohol   Use daily SPF30+ sunscreen and other methods of coverage for sun protection  Use water-based make-up   Avoid applying topical steroids to affected areas as they can cause perioral dermatitis and exacerbate rosacea     Topical Treatment Approach  Metronidazole cream or gel by itself or in combination with oral antibiotics for more severe cases  Azelaic acid cream or lotion is effective for mild inflammatory rosacea when applied twice daily to affected areas  Brimonidine gel and oxymetazoline hydrochloride cream can reduce facial redness temporarily   Ivermectin cream can treat papulopustular rosacea by controlling demodex mites and inflammation   Pimecrolimus cream or tacrolimus ointment twice a day for 2-3 months can help reduce inflammation    Oral Treatment Approach  Antibiotics such as doxycycline, minocycline, or erythromycin for 1-3 months  Clonidine and carvedilol can help reduce facial flushing and are generally well tolerated. Common side effects include low blood pressure, gastrointestinal upset, dry eyes, blurred vision and low heart rate.   Isotretinoin at low doses can be effective for long term treatment when antibiotics fail. Side effects may make it unsuitable for some patients.   NSAIDs such as diclofenac can help reduce discomfort and redness in the skin.     Procedural/Surgical Treatment Approach   Vascular lasers or intense pulsed light treatment may be used to treat persistent telangiectasia and papulopustular rosacea  Plastic surgery and carbon dioxide lasers may be used to treat rhinophyma     FOLLICULITIS    Physical Exam:  Anatomic Location Affected:  Post Neck  Morphological Description:  clear today   Pertinent  Positives:  Pertinent Negatives:    Additional History of Present Condition:  patient complains of pimples in hairline, clear at present.  Gets 1 or 2 at a time.  Denies irritation    Assessment and Plan:  Based on a thorough discussion of this condition and the management approach to it (including a comprehensive discussion of the known risks, side effects and potential benefits of treatment), the patient (family) agrees to implement the following specific plan:  Clindamycin Lotion twice a day for 2 weeks    What is folliculitis?  Folliculitis is the name given to a group of skin conditions in which there are inflamed hair follicles. The result is a tender red spot, often with a surface pustule.  Folliculitis may be superficial or deep. It can affect anywhere there are hairs, including chest, back, buttocks, arms and legs. Acne and its variants are also types of folliculitis.    What causes folliculitis?  Folliculitis can be due to infection, occlusion (blockage), irritation and various skin diseases.    Folliculitis due to infection  To determine if folliculitis is due to an infection, swabs should be taken from the pustules for cytology and culture in the laboratory.    Bacteria  Bacterial folliculitis is commonly due to Staphylococcus aureus. If the infection involves the deep part of the follicle, it results in a painful boil. Recommended treatment includes careful hygiene, antiseptic cleanser or cream, antibiotic ointment, and/or oral antibiotics.    Spa pool folliculitis is due to infection with Pseudomonas aeruginosa, which thrives in inadequately chlorinated warm water. Gram negative folliculitis is a pustular facial eruption also due to infection with Pseudomonas aeruginosa or other similar organisms. When it appears, it usually follows tetracycline treatment of acne, but is quite rare.    Yeasts  The most common yeast to cause a folliculitis is Pityrosporum ovale, also known as Malassezia. Malassezia  folliculitis (Pityrosporum folliculitis) is an itchy acne-like condition usually affecting the upper trunk of a young adult. Treatment includes avoiding moisturisers, stopping any antibiotics and topical antifungal or oral antifungal medication for several weeks.    Candida albicans can also provoke a folliculitis in skin folds (intertrigo) or in the beard area. It is treated with topical or oral antifungal agents.    Fungi  Ringworm of the scalp (tinea capitis) usually results in scaling and hair loss, but sometimes results in folliculitis. In [place], cat ringworm (Microsporum canis) is the commonest organism causing scalp fungal infection. Other fungi such as Trichophyton tonsurans are increasingly reported. Treatment is with oral antifungal agents for several months.    Viral infections  Folliculitis may caused by herpes simplex virus. This tends to be tender, and resolves without treatment in around 10 days. Severe recurrent attacks may be treated with acyclovir and other antiviral agents.    Herpes zoster (the cause of shingles) may also present as folliculitis with painful pustules and crusted spots within a dermatome (an area of skin supplied by a single nerve). It is treated with hihg-dose acyclovir.  Molluscum contagiosum, common in young children, may also cause follicular umbilicated papules, usually clustered in and around a body fold. Molluscum may provoke dermatitis.    Parasitic infection  Folliculitis on the face or scalp of older or immunosuppressed adults may be due to colonisation by hair follicle mites (demodex). This is known as demodicosis.  The human infestation, scabies, often provokes folliculitis, as well as non-follicular papules, vesicles and pustules.    Folliculitis due to irritation from regrowing hairs  Folliculitis may arise as hairs regrow after shaving, waxing, electrolysis or plucking. Swabs taken from the pustules are sterile ie there is no growth of bacteria or other  organisms. In the beard area irritant folliculitis is known as pseudofolliculitis barbae.  Irritant folliculitis is also common on the lower legs of women (shaving rash). It is frequently very itchy. Treatment is by stopping hair removal, and not beginning again for about three months after the folliculitis has settled. To prevent reoccurring irritant folliculitis, use a gentle hair removal method, such as a lady's electric razor. Avoid soap and apply plenty of shaving gel, if using a blade shaver.    Folliculitis due to contact reactions  Occlusion  Paraffin-based ointments, moisturisers, and adhesive plasters may all result in a sterile folliculitis. If a moisturiser is needed, choose an oil-free product, as it is less likely to cause occlusion.    Chemicals  Coal tar, cutting oils and other chemicals may cause an irritant folliculitis. Avoid contact with the causative product.    Topical steroids  Overuse of topical steroids may produce a folliculitis. Perioral dermatitis is a facial folliculitis provoked by moisturisers and topical steroids. Perioral dermatitis is treated with tetracycline antibiotics for six weeks or so.    Folliculitis due to immunosuppression  Eosinophilic folliculitis is a specific type of folliculitis that may arise in some immune suppressed individuals such as those infected by human immunodeficiency virus (HIV) or those who have cancer.    Folliculitis due to drugs  Folliculitis may be due to drugs, particularly corticosteroids (steroid acne), androgens (male hormones), ACTH, lithium, isoniazid (INH), phenytoin and B-complex vitamins. Protein kinase inhibitors (epidermal growth factor receptor inhibitors) and targeted therapy for metastatic melanoma (vemurafenib, dabrafenib) nearly always result in folliculitis.    Folliculitis due to inflammatory skin diseases  Certain uncommon inflammatory skin diseases may cause permanent hair loss and scarring because of deep seated sterile  folliculitis. These include:  Lichen planus  Discoid lupus erythematosus  Folliculitis decalvans  Folliculitis keloidalis     Treatment depends on the underlying condition and its severity. A skin biopsy is often necessary to establish the diagnosis.    Acne variants   Acne and acne-like or acneform disorders are also forms of folliculitis. These include:  Acne vulgaris  Nodulocystic acne  Rosacea  Scalp folliculitis  Chloracne    The follicular occlusion syndrome refers to:  Hidradenitis suppurativa (acne inversa)  Acne conglobata (a severe form of nodulocystic acne)  Dissecting cellulitis (perifolliculitis capitis abscedens et suffodiens)  Pilonidal sinus.    Treatment of the acne variants may include topical therapy as well as long courses of tetracycline antibiotics, isotretinoin (vitamin-A derivative) and in women, antiandrogenic therapy.    Buttock folliculitis  Folliculitis affecting the buttocks is quite common and is often nonspecific, ie no specific cause is found. Buttock folliculitis is equally common in males and females.  Acute buttock folliculitis is usually bacterial in origin (like boils), resulting in red painful papules and pustules. It clears with antibiotics.  Chronic buttock folliculitis does not often cause significant symptoms but it can be very persistent. Although antiseptics, topical acne treatments, peeling agents such as alphahydroxy acids, long courses of oral antibiotics and isotretinoin can help buttock folliculitis, they are not always effective. Hair removal might be worth trying if the affected area is hairy. As regrowth of hair can make it worse, permanent hair reduction by laser or intense pulsed light (IPL) is best.    Scribe Attestation    I,:   am acting as a scribe while in the presence of the attending physician.:       I,:   personally performed the services described in this documentation    as scribed in my presence.:

## 2025-02-10 NOTE — PATIENT INSTRUCTIONS
ROSACEA    Physical Exam:  Anatomic Location Affected:  Face  Morphological Description:  erythema with papules  Pertinent Positives:  Pertinent Negatives:    Additional History of Present Condition:  currently on Doxycycline 20mg twice a day    Assessment and Plan:  Based on a thorough discussion of this condition and the management approach to it (including a comprehensive discussion of the known risks, side effects and potential benefits of treatment), the patient (family) agrees to implement the following specific plan:  Stop Metrocream  Azelaic Acid Cream apply to face twice a day  Continue with Doxycycline 20mg twice a day    Rosacea is a chronic rash affecting the mid-face including the nose, cheeks, chin, forehead, and eyelids. The incidence is usually greatest between the ages of 30-60 years and is more common in people with fair skin. Common characteristics include redness, telangiectasias, papules and pustules over affected areas. Rosacea may look similar to acne, but there is a lack of comedones. Occasionally the eyes may also be involved in ocular rosacea. In advanced disease, enlargement of the sebaceous glands in the nose, termed rhinophyma, may be present.     Rosacea results in red spots (papules) and sometimes pustules over the face, but unlike acne there are no blackheads, whiteheads, or cystic nodules. Patients often experience increased facial flushing with prominent blood vessels (erythematotelangiectatic rosacea) and dry, sensitive skin. These symptoms are exacerbated by sun exposure, hot or spicy foods, topical steroids and oil-based facial products.     In ocular rosacea, eyelids may be red and sore due to conjunctivitis, keratitis, and episcleritis. If rhinophyma develops due to enlargement of sebaceous glands, the patient may have an enlarged and irregularly shaped nose with prominent pores. In rosacea that is refractory to treatment, patients can develop persistent redness and swelling  of the face due to lymphatic obstruction (Morbihan disease).     Distribution around the cheeks may be confused with the malar or “butterfly rash” of lupus. However, the rash of lupus spares the nasal creases and lacks papules and pustules. If signs of photosensitivity, oral ulcers, arthritis, and kidney dysfunction are present then consider referral to a rheumatologist.     There are many potential causes of rosacea including genetic, environmental, vascular, and inflammatory factors. These include, but are not limited to:  Chronic exposure to ultraviolet radiation   Increased immune responses in the form of cathelicidins that promote vessel dilation and infiltration with white blood cells (neutrophils) into the dermis  Increased matrix metalloproteinases such as collagen and elastase that remodel normal tissue may contribute to inflammation of the skin making it thicker and harder  There is some evidence to suggest that increased numbers of demodex mites on patient skin may contribute to rosacea papules     General Treatment Approach   Avoid exacerbating factors such as heat, spicy foods, and alcohol   Use daily SPF30+ sunscreen and other methods of coverage for sun protection  Use water-based make-up   Avoid applying topical steroids to affected areas as they can cause perioral dermatitis and exacerbate rosacea     Topical Treatment Approach  Metronidazole cream or gel by itself or in combination with oral antibiotics for more severe cases  Azelaic acid cream or lotion is effective for mild inflammatory rosacea when applied twice daily to affected areas  Brimonidine gel and oxymetazoline hydrochloride cream can reduce facial redness temporarily   Ivermectin cream can treat papulopustular rosacea by controlling demodex mites and inflammation   Pimecrolimus cream or tacrolimus ointment twice a day for 2-3 months can help reduce inflammation    Oral Treatment Approach  Antibiotics such as doxycycline, minocycline,  or erythromycin for 1-3 months  Clonidine and carvedilol can help reduce facial flushing and are generally well tolerated. Common side effects include low blood pressure, gastrointestinal upset, dry eyes, blurred vision and low heart rate.   Isotretinoin at low doses can be effective for long term treatment when antibiotics fail. Side effects may make it unsuitable for some patients.   NSAIDs such as diclofenac can help reduce discomfort and redness in the skin.     Procedural/Surgical Treatment Approach   Vascular lasers or intense pulsed light treatment may be used to treat persistent telangiectasia and papulopustular rosacea  Plastic surgery and carbon dioxide lasers may be used to treat rhinophyma     FOLLICULITIS    Physical Exam:  Anatomic Location Affected:  Post Neck  Morphological Description:  clear today   Pertinent Positives:  Pertinent Negatives:    Additional History of Present Condition:  present on exam    Assessment and Plan:  Based on a thorough discussion of this condition and the management approach to it (including a comprehensive discussion of the known risks, side effects and potential benefits of treatment), the patient (family) agrees to implement the following specific plan:  Clindamycin Lotion twice a day for 2 weeks    What is folliculitis?  Folliculitis is the name given to a group of skin conditions in which there are inflamed hair follicles. The result is a tender red spot, often with a surface pustule.  Folliculitis may be superficial or deep. It can affect anywhere there are hairs, including chest, back, buttocks, arms and legs. Acne and its variants are also types of folliculitis.    What causes folliculitis?  Folliculitis can be due to infection, occlusion (blockage), irritation and various skin diseases.    Folliculitis due to infection  To determine if folliculitis is due to an infection, swabs should be taken from the pustules for cytology and culture in the  laboratory.    Bacteria  Bacterial folliculitis is commonly due to Staphylococcus aureus. If the infection involves the deep part of the follicle, it results in a painful boil. Recommended treatment includes careful hygiene, antiseptic cleanser or cream, antibiotic ointment, and/or oral antibiotics.    Spa pool folliculitis is due to infection with Pseudomonas aeruginosa, which thrives in inadequately chlorinated warm water. Gram negative folliculitis is a pustular facial eruption also due to infection with Pseudomonas aeruginosa or other similar organisms. When it appears, it usually follows tetracycline treatment of acne, but is quite rare.    Yeasts  The most common yeast to cause a folliculitis is Pityrosporum ovale, also known as Malassezia. Malassezia folliculitis (Pityrosporum folliculitis) is an itchy acne-like condition usually affecting the upper trunk of a young adult. Treatment includes avoiding moisturisers, stopping any antibiotics and topical antifungal or oral antifungal medication for several weeks.    Candida albicans can also provoke a folliculitis in skin folds (intertrigo) or in the beard area. It is treated with topical or oral antifungal agents.    Fungi  Ringworm of the scalp (tinea capitis) usually results in scaling and hair loss, but sometimes results in folliculitis. In [place], cat ringworm (Microsporum canis) is the commonest organism causing scalp fungal infection. Other fungi such as Trichophyton tonsurans are increasingly reported. Treatment is with oral antifungal agents for several months.    Viral infections  Folliculitis may caused by herpes simplex virus. This tends to be tender, and resolves without treatment in around 10 days. Severe recurrent attacks may be treated with acyclovir and other antiviral agents.    Herpes zoster (the cause of shingles) may also present as folliculitis with painful pustules and crusted spots within a dermatome (an area of skin supplied by a  single nerve). It is treated with hihg-dose acyclovir.  Molluscum contagiosum, common in young children, may also cause follicular umbilicated papules, usually clustered in and around a body fold. Molluscum may provoke dermatitis.    Parasitic infection  Folliculitis on the face or scalp of older or immunosuppressed adults may be due to colonisation by hair follicle mites (demodex). This is known as demodicosis.  The human infestation, scabies, often provokes folliculitis, as well as non-follicular papules, vesicles and pustules.    Folliculitis due to irritation from regrowing hairs  Folliculitis may arise as hairs regrow after shaving, waxing, electrolysis or plucking. Swabs taken from the pustules are sterile ie there is no growth of bacteria or other organisms. In the beard area irritant folliculitis is known as pseudofolliculitis barbae.  Irritant folliculitis is also common on the lower legs of women (shaving rash). It is frequently very itchy. Treatment is by stopping hair removal, and not beginning again for about three months after the folliculitis has settled. To prevent reoccurring irritant folliculitis, use a gentle hair removal method, such as a lady's electric razor. Avoid soap and apply plenty of shaving gel, if using a blade shaver.    Folliculitis due to contact reactions  Occlusion  Paraffin-based ointments, moisturisers, and adhesive plasters may all result in a sterile folliculitis. If a moisturiser is needed, choose an oil-free product, as it is less likely to cause occlusion.    Chemicals  Coal tar, cutting oils and other chemicals may cause an irritant folliculitis. Avoid contact with the causative product.    Topical steroids  Overuse of topical steroids may produce a folliculitis. Perioral dermatitis is a facial folliculitis provoked by moisturisers and topical steroids. Perioral dermatitis is treated with tetracycline antibiotics for six weeks or so.    Folliculitis due to  immunosuppression  Eosinophilic folliculitis is a specific type of folliculitis that may arise in some immune suppressed individuals such as those infected by human immunodeficiency virus (HIV) or those who have cancer.    Folliculitis due to drugs  Folliculitis may be due to drugs, particularly corticosteroids (steroid acne), androgens (male hormones), ACTH, lithium, isoniazid (INH), phenytoin and B-complex vitamins. Protein kinase inhibitors (epidermal growth factor receptor inhibitors) and targeted therapy for metastatic melanoma (vemurafenib, dabrafenib) nearly always result in folliculitis.    Folliculitis due to inflammatory skin diseases  Certain uncommon inflammatory skin diseases may cause permanent hair loss and scarring because of deep seated sterile folliculitis. These include:  Lichen planus  Discoid lupus erythematosus  Folliculitis decalvans  Folliculitis keloidalis     Treatment depends on the underlying condition and its severity. A skin biopsy is often necessary to establish the diagnosis.    Acne variants   Acne and acne-like or acneform disorders are also forms of folliculitis. These include:  Acne vulgaris  Nodulocystic acne  Rosacea  Scalp folliculitis  Chloracne    The follicular occlusion syndrome refers to:  Hidradenitis suppurativa (acne inversa)  Acne conglobata (a severe form of nodulocystic acne)  Dissecting cellulitis (perifolliculitis capitis abscedens et suffodiens)  Pilonidal sinus.    Treatment of the acne variants may include topical therapy as well as long courses of tetracycline antibiotics, isotretinoin (vitamin-A derivative) and in women, antiandrogenic therapy.    Buttock folliculitis  Folliculitis affecting the buttocks is quite common and is often nonspecific, ie no specific cause is found. Buttock folliculitis is equally common in males and females.  Acute buttock folliculitis is usually bacterial in origin (like boils), resulting in red painful papules and pustules. It  clears with antibiotics.  Chronic buttock folliculitis does not often cause significant symptoms but it can be very persistent. Although antiseptics, topical acne treatments, peeling agents such as alphahydroxy acids, long courses of oral antibiotics and isotretinoin can help buttock folliculitis, they are not always effective. Hair removal might be worth trying if the affected area is hairy. As regrowth of hair can make it worse, permanent hair reduction by laser or intense pulsed light (IPL) is best.

## 2025-04-14 ENCOUNTER — OFFICE VISIT (OUTPATIENT)
Age: 60
End: 2025-04-14
Payer: COMMERCIAL

## 2025-04-14 VITALS — BODY MASS INDEX: 26.63 KG/M2 | WEIGHT: 160 LBS | TEMPERATURE: 97.2 F

## 2025-04-14 DIAGNOSIS — D48.5 NEOPLASM OF UNCERTAIN BEHAVIOR OF SKIN: Primary | ICD-10-CM

## 2025-04-14 PROCEDURE — 88305 TISSUE EXAM BY PATHOLOGIST: CPT | Performed by: PATHOLOGY

## 2025-04-14 PROCEDURE — 11104 PUNCH BX SKIN SINGLE LESION: CPT | Performed by: DERMATOLOGY

## 2025-04-14 NOTE — PROGRESS NOTES
"[facility]atology Clinic Note     Patient Name: Suzette Carr  Encounter Date: 4/14/25     Have you been cared for by a [facility] Dermatologist in the last 3 years and, if so, which description applies to you?    Yes.  I have been here within the last 3 years, and my medical history has NOT changed since that time.  I am FEMALE/of child-bearing potential.    REVIEW OF SYSTEMS:  Have you recently had or currently have any of the following? No changes in my recent health.   PAST MEDICAL HISTORY:  Have you personally ever had or currently have any of the following?  If \"YES,\" then please provide more detail. No changes in my medical history.   HISTORY OF IMMUNOSUPPRESSION: Do you have a history of any of the following:  Systemic Immunosuppression such as Diabetes, Biologic or Immunotherapy, Chemotherapy, Organ Transplantation, Bone Marrow Transplantation or Prednisone?  No     Answering \"YES\" requires the addition of the dotphrase \"IMMUNOSUPPRESSED\" as the first diagnosis of the patient's visit.   FAMILY HISTORY:  Any \"first degree relatives\" (parent, brother, sister, or child) with the following?    No changes in my family's known health.   PATIENT EXPERIENCE:    Do you want the Dermatologist to perform a COMPLETE skin exam today including a clinical examination under the \"bra and underwear\" areas?  NO  If necessary, do we have your permission to call and leave a detailed message on your Preferred Phone number that includes your specific medical information?  Yes      Allergies   Allergen Reactions   • Amoxicillin-Pot Clavulanate GI Intolerance     VOMITTING   • Ciprofloxacin Other (See Comments)     HEADACHES      Current Outpatient Medications:   •  aspirin (ECOTRIN LOW STRENGTH) 81 mg EC tablet, Take 81 mg by mouth daily, Disp: , Rfl:   •  Calcium Carbonate Antacid (CALCIUM CARBONATE PO), Take 1,000 mg by mouth daily, Disp: , Rfl:   •  Multiple Vitamin (MULTIVITAMIN ADULT PO), Take 1 tablet by mouth daily, " Disp: , Rfl:   •  TURMERIC PO, Take by mouth daily, Disp: , Rfl:   •  azelaic acid (Azelex) 20 % cream, Apply topically 2 (two) times a day (Patient not taking: Reported on 4/14/2025), Disp: 50 g, Rfl: 2  •  clindamycin (CLEOCIN T) 1 % lotion, Apply topically 2 (two) times a day (Patient not taking: Reported on 4/14/2025), Disp: 60 mL, Rfl: 2  •  doxycycline (PERIOSTAT) 20 MG tablet, TAKE ONE TABLET BY MOUTH TWICE A DAY FOR 2 MONTHS. TAKE WITH FOOD AND WATER BOTH IN THE MORNING AND EVENING (GENERIC FOR PERIOSTAT) (Patient not taking: Reported on 4/14/2025), Disp: 90 tablet, Rfl: 1  •  metroNIDAZOLE (METROCREAM) 0.75 % cream, Apply twice daily to face (Patient not taking: Reported on 2/10/2025), Disp: 45 g, Rfl: 4          Whom besides the patient is providing clinical information about today's encounter?   NO ADDITIONAL HISTORIAN (patient alone provided history)    Physical Exam and Assessment/Plan by Diagnosis:      PROCEDURE NOTE:  PUNCH BIOPSY      Performing Physician:     Anatomic Location; Clinical Description with size (cm); Pre-Op Diagnosis:    Left lower cheek; dermal red nodule: Acne vs Rosacea Rule out granulomatous rosacea           ATTENTION:  DERMPATH GROUP    SPECIMEN A; Skin; Anatomic Location: left lower cheek; Procedure/Protocol: Skin Specimen (submit in [facility]):Punch Biopsy (when a punch biopsy tool is used; simple closure is included) (CPT 61088; each additional punch biopsy is CPT 78024)   59 y.o. year old  Female with a Morphological Description: dermal red nodule  Differential Diagnosis and/or Specific Clinical Question:Acne vs Rosacea Rule out granuloma                           Anesthesia: 1% xylocaine with epi       Topical anesthesia: None       Indications: To indicate diagnosis and management plan.    Procedure Details     Patient informed of the risks (including bleeding,scaring and infection) and benefits of the procedure explained. Verbal and written informed consent  obtained. The area was prepped and draped in the usual fashion. Anesthesia was obtained with 1% lidocaine with epinephrine. The skin was then stretched perpendicular to the skin tension lines and a punch biopsy to an appropriate sampling depth was obtained with a 3 mm punch with a forceps and iris scissors.     Hemostasis was obtained with 5-0 Ethilon x 1 sutures.     Complications:  None      Specimen has been sent for review by Dermatopathology.      Plan:  1. Instructed to keep the wound dry and covered for 24-48h and clean thereafter.  2. Warning signs of infection were reviewed.    3. Recommended that the patient use acetaminophen as needed for pain  4. Sutures if any should be removed in 7 days      Standard post-procedure care has been explained and has been included in written form within the patient's copy of Informed Consent.    Scribe Attestation    I,:  Debora Ceballos am acting as a scribe while in the presence of the attending physician.:       I,:  Isela Cespedes MD personally performed the services described in this documentation    as scribed in my presence.:

## 2025-04-14 NOTE — PATIENT INSTRUCTIONS
PROCEDURE NOTE:  PUNCH BIOPSY        Plan:  1. Instructed to keep the wound dry and covered for 24-48h and clean thereafter.  2. Warning signs of infection were reviewed.    3. Recommended that the patient use acetaminophen as needed for pain  4. Sutures if any should be removed in 7 days      Standard post-procedure care has been explained and has been included in written form within the patient's copy of Informed Consent.

## 2025-04-18 PROCEDURE — 88305 TISSUE EXAM BY PATHOLOGIST: CPT | Performed by: PATHOLOGY

## 2025-04-21 ENCOUNTER — OFFICE VISIT (OUTPATIENT)
Age: 60
End: 2025-04-21

## 2025-04-21 ENCOUNTER — RESULTS FOLLOW-UP (OUTPATIENT)
Age: 60
End: 2025-04-21

## 2025-04-21 VITALS — HEIGHT: 65 IN | BODY MASS INDEX: 26.66 KG/M2 | WEIGHT: 160 LBS

## 2025-04-21 DIAGNOSIS — L71.9 ROSACEA: Primary | ICD-10-CM

## 2025-04-21 DIAGNOSIS — Z48.02 VISIT FOR SUTURE REMOVAL: Primary | ICD-10-CM

## 2025-04-21 PROCEDURE — RECHECK: Performed by: DERMATOLOGY

## 2025-04-21 RX ORDER — DOXYCYCLINE 100 MG/1
100 CAPSULE ORAL EVERY 12 HOURS SCHEDULED
Qty: 60 CAPSULE | Refills: 2 | Status: SHIPPED | OUTPATIENT
Start: 2025-04-21 | End: 2025-07-20

## 2025-04-21 NOTE — PROGRESS NOTES
"[facility] Dermatology Clinic Note     Patient Name: Suzette Carr  Encounter Date: 4/21/2025       Have you been cared for by a [facility] Dermatologist in the last 3 years and, if so, which description applies to you? Yes. I have been here within the last 3 years, and my medical history has NOT changed since that time. I am not of child-bearing potential.     REVIEW OF SYSTEMS:  Have you recently had or currently have any of the following? No changes in my recent health.   PAST MEDICAL HISTORY:  Have you personally ever had or currently have any of the following?  If \"YES,\" then please provide more detail. No changes in my medical history.   HISTORY OF IMMUNOSUPPRESSION: Do you have a history of any of the following:  Systemic Immunosuppression such as Diabetes, Biologic or Immunotherapy, Chemotherapy, Organ Transplantation, Bone Marrow Transplantation or Prednisone?  No     Answering \"YES\" requires the addition of the dotphrase \"IMMUNOSUPPRESSED\" as the first diagnosis of the patient's visit.   FAMILY HISTORY:  Any \"first degree relatives\" (parent, brother, sister, or child) with the following?    No changes in my family's known health.   PATIENT EXPERIENCE:    Do you want the Dermatologist to perform a COMPLETE skin exam today including a clinical examination under the \"bra and underwear\" areas?  NO  If necessary, do we have your permission to call and leave a detailed message on your Preferred Phone number that includes your specific medical information?  Yes      Allergies   Allergen Reactions    Amoxicillin-Pot Clavulanate GI Intolerance     VOMITTING    Ciprofloxacin Other (See Comments)     HEADACHES      Current Outpatient Medications:     aspirin (ECOTRIN LOW STRENGTH) 81 mg EC tablet, Take 81 mg by mouth daily, Disp: , Rfl:     Calcium Carbonate Antacid (CALCIUM CARBONATE PO), Take 1,000 mg by mouth daily, Disp: , Rfl:     Multiple Vitamin (MULTIVITAMIN ADULT PO), Take 1 tablet by mouth daily, Disp: , " Rfl:     TURMERIC PO, Take by mouth daily, Disp: , Rfl:     azelaic acid (Azelex) 20 % cream, Apply topically 2 (two) times a day (Patient not taking: Reported on 4/21/2025), Disp: 50 g, Rfl: 2    clindamycin (CLEOCIN T) 1 % lotion, Apply topically 2 (two) times a day (Patient not taking: Reported on 4/21/2025), Disp: 60 mL, Rfl: 2    doxycycline (PERIOSTAT) 20 MG tablet, TAKE ONE TABLET BY MOUTH TWICE A DAY FOR 2 MONTHS. TAKE WITH FOOD AND WATER BOTH IN THE MORNING AND EVENING (GENERIC FOR PERIOSTAT) (Patient not taking: Reported on 4/21/2025), Disp: 90 tablet, Rfl: 1    metroNIDAZOLE (METROCREAM) 0.75 % cream, Apply twice daily to face (Patient not taking: Reported on 4/21/2025), Disp: 45 g, Rfl: 4              Whom besides the patient is providing clinical information about today's encounter?   NO ADDITIONAL HISTORIAN (patient alone provided history)    Physical Exam and Assessment/Plan by Diagnosis:              Suture removal    Date/Time: 4/21/2025 8:00 AM    Performed by: Di Harding MA  Authorized by: Isela Cespedes MD  Universal Protocol:  Procedure performed by:  Consent: Verbal consent obtained.  Consent given by: patient  Timeout called at: 4/21/2025 8:06 AM.  Patient understanding: patient states understanding of the procedure being performed  Patient consent: the patient's understanding of the procedure matches consent given  Procedure consent: procedure consent matches procedure scheduled  Relevant documents: relevant documents present and verified  Test results: test results available and properly labeled  Site marked: the operative site was not marked  Radiology Images displayed and confirmed. If images not available, report reviewed: imaging studies not available  Patient identity confirmed: verbally with patient      Patient location:  Clinic  Location:     Laterality:  Left    Location:  Head/neck    Head/neck location:  Cheek  Procedure details:     Tools used:  Suture  removal kit    Wound appearance:  Pink, moist, clean, good wound healing and no sign(s) of infection    Number of sutures removed:  1  Post-procedure details:     Post-removal:  Band-Aid applied  Comments:      Applied Vaseline for patient.         Scribe Attestation      I,:  Di Harding MA am acting as a scribe while in the presence of the attending physician.:       I,:  Isela Cespedes MD personally performed the services described in this documentation    as scribed in my presence.:

## 2025-04-21 NOTE — RESULT ENCOUNTER NOTE
DERMATOPATHOLOGY RESULT NOTE    Results reviewed by ordering physician.  Called patient to personally discuss results. Discussed results with patient.       Instructions for Clinical Derm Team:   (remember to route Result Note to appropriate staff):    Call patient and schedule for 2-3 month follow up    Result & Plan by Specimen:    Specimen A: consistent with rosacea  Plan: prescription for doxycycline and azelaic acid/ivermectin/metronidazole cream from [facility]      .

## 2025-07-21 DIAGNOSIS — L71.9 ROSACEA: ICD-10-CM

## 2025-07-22 RX ORDER — DOXYCYCLINE 100 MG/1
CAPSULE ORAL
Qty: 60 CAPSULE | Refills: 1 | Status: SHIPPED | OUTPATIENT
Start: 2025-07-22 | End: 2025-09-20